# Patient Record
Sex: MALE | Race: WHITE | HISPANIC OR LATINO | Employment: FULL TIME | URBAN - METROPOLITAN AREA
[De-identification: names, ages, dates, MRNs, and addresses within clinical notes are randomized per-mention and may not be internally consistent; named-entity substitution may affect disease eponyms.]

---

## 2018-05-29 ENCOUNTER — SLEEP CENTER VISIT (OUTPATIENT)
Dept: SLEEP MEDICINE | Facility: MEDICAL CENTER | Age: 33
End: 2018-05-29
Payer: COMMERCIAL

## 2018-05-29 VITALS
HEART RATE: 77 BPM | SYSTOLIC BLOOD PRESSURE: 112 MMHG | BODY MASS INDEX: 29.96 KG/M2 | RESPIRATION RATE: 16 BRPM | HEIGHT: 71 IN | OXYGEN SATURATION: 96 % | WEIGHT: 214 LBS | DIASTOLIC BLOOD PRESSURE: 78 MMHG

## 2018-05-29 DIAGNOSIS — E78.5 DYSLIPIDEMIA: ICD-10-CM

## 2018-05-29 DIAGNOSIS — Z78.9 NON-SMOKER: ICD-10-CM

## 2018-05-29 DIAGNOSIS — G47.33 OSA (OBSTRUCTIVE SLEEP APNEA): ICD-10-CM

## 2018-05-29 PROCEDURE — 99204 OFFICE O/P NEW MOD 45 MIN: CPT | Performed by: INTERNAL MEDICINE

## 2018-05-29 RX ORDER — ATORVASTATIN CALCIUM 10 MG/1
1 TABLET, FILM COATED ORAL
COMMUNITY
Start: 2018-02-14 | End: 2018-08-28

## 2018-05-29 RX ORDER — ALPRAZOLAM 1 MG/1
1 TABLET ORAL
COMMUNITY
Start: 2018-04-23

## 2018-05-29 NOTE — PROGRESS NOTES
CC: Establish care for sleep apnea    HPI:    Marin Cao is a 33y/o male kindly referred by Kolton Zhang M.D. to establish care for sleep apnea diagnosed at [facility].    The patient's sleep study was performed in December 2016 and showed an apnea hypopnea index of 47.8 with a monae saturation of 77%.  The patient was noted to have significant snoring and a moderate degree of periodic limb movements with out arousals.     He was not able to use the machine regularly until just recently. Still feeling a bit tired but has only used his unit for the last month.    The patient's 30 day compliance shows usage for 28 out of 30 days for an average 7 hours 3 minutes.  He has residual apnea hypopnea index of 1.2.  His average time in large leak today is 2 minutes and 2 seconds.  He is on auto titrating CPAP 10-13 cm H2O.    He is not having any undue problems with mask fit, mask leak, airway dryness, or pressure tolerance.    He continues to achieve significant benefit from the use of his cpap.    There are no active problems to display for this patient.      Past Medical History:   Diagnosis Date   • Hyperlipidemia    • Sleep apnea         History reviewed. No pertinent surgical history.    Family History   Problem Relation Age of Onset   • No Known Problems Mother    • No Known Problems Father    • Sleep Apnea Neg Hx        Social History     Social History   • Marital status: Single     Spouse name: N/A   • Number of children: N/A   • Years of education: N/A     Occupational History   • Not on file.     Social History Main Topics   • Smoking status: Never Smoker   • Smokeless tobacco: Never Used   • Alcohol use 1.2 oz/week     2 Cans of beer per week   • Drug use: No   • Sexual activity: Not on file     Other Topics Concern   • Not on file     Social History Narrative   • No narrative on file       Current Outpatient Prescriptions   Medication Sig Dispense Refill   • ALPRAZolam  "(XANAX) 1 MG Tab Take 1 mg by mouth.     • atorvastatin (LIPITOR) 10 MG Tab Take 1 tablet by mouth.       No current facility-administered medications for this visit.     \"CURRENT RX\"    ALLERGIES: Penicillins    ROS  Constitutional: Denies fever, chills, sweats,  weight loss, fatigue.  Eyes: Denies vision loss, pain, drainage, double vision, glasses.  Ears/Nose/Mouth/Throat: Denies earache, difficulty hearing, rhinitis/nasal congestion, injury, recurrent sore throat, persistent hoarseness, decayed teeth/toothaches, ringing or buzzing in the ears.  Cardiovascular: Denies chest pain, tightness, palpitations, swelling in legs/feet, fainting, difficulty breathing when lying down but gets better when sitting up.   Respiratory: Denies shortness of breath, cough, sputum, wheezing, painful breathing, coughing up blood.   Sleep: per HPI  Gastrointestinal: Denies  difficulty swallowing, nausea, abdominal pain, diarrhea, constipation, heartburn.  Genitourinary: Denies  blood in urine, discharge, frequent urination.   Musculoskeletal: Denies painful joints, sore muscles, back pain.   Integumentary: Denies rashes, lumps, color changes.   Neurological: Denies frequent headaches,weakness, dizziness.    PHYSICAL EXAM    /78   Pulse 77   Resp 16   Ht 1.803 m (5' 11\")   Wt 97.1 kg (214 lb)   SpO2 96%   BMI 29.85 kg/m²   Appearance: Well-nourished, well-developed, no acute distress  Eyes:  PERRLA, EOMI  ENMT: without lesions, deformities;hearing grossly intact; tongue normal, posterior pharynx without erythema or exudate; Mallampati classification:   Neck: Supple, trachea midline, no masses  Respiratory effort:  No intercostal retractions or use of accessory muscles  Lung auscultation:  No wheezes rhonchi rubs or rales  Cardiac: No murmurs, rubs, or gallops; regular rhythm, normal rate; no edema  Abdomen:  No tenderness, no organomegaly  Musculoskeletal:  Grossly normal; gait and station normal; digits and nails " "normal  Skin:  No rashes, petechiae, cyanosis  Neurologic: without focal signs; oriented to person, time, place, and purpose; judgement intact  Psychiatric:  No depression, anxiety, agitation      PROBLEMS:    1. AGUS (obstructive sleep apnea)    - DME MASK AND SUPPLIES    2. Non-smoker      3. Dyslipidemia      4.  PLMS (not RLS)        PLAN:   Has been advised to continue the current auto titrating CPAP 10-13 cm H2O., clean equipment frequently, and get new mask and supplies as allowed by insurance and DME. Advised about potential problems including nasal obstruction/stuffiness, mask leak or discomfort , frequent awakenings, chill or dryness of the upper airway, noise, inconvenience, and lack of benefit. Recommend an earlier appointment, if significant treatment barriers develop.    He is still a bit sleepy but has been on Rx with cpap for only about a month. If EDS persists in 3 months at the next OV, consider MSLT protocol or empiric Nuvigil.    Sleep hygiene or the basic rules for good night sleep were discussed with the patient. The patient was advised to sleep only as much as as needed to feel rested and then to get out of bed, to keep a regular sleep schedule, to try to avoid forcing sleep, to exercise regularly for at least 20 minutes preferably 4-5 hours prior to bedtime, to avoid caffeinated beverages after lunch, to avoid alcohol near bedtime and avoid \"nightcaps\", to avoid smoking especially in the evening, to avoid going to bed hungry, to keep the bedroom cool and dark, to avoid prolonged use of light emitting screens before bedtime, and to deal with worries before bedtime.      Return in about 3 months (around 8/29/2018).                      "

## 2018-08-29 ENCOUNTER — SLEEP CENTER VISIT (OUTPATIENT)
Dept: SLEEP MEDICINE | Facility: MEDICAL CENTER | Age: 33
End: 2018-08-29
Payer: COMMERCIAL

## 2018-08-29 VITALS
DIASTOLIC BLOOD PRESSURE: 64 MMHG | WEIGHT: 215 LBS | SYSTOLIC BLOOD PRESSURE: 114 MMHG | BODY MASS INDEX: 30.1 KG/M2 | HEIGHT: 71 IN | OXYGEN SATURATION: 95 % | RESPIRATION RATE: 16 BRPM | HEART RATE: 79 BPM

## 2018-08-29 DIAGNOSIS — G47.10 HYPERSOMNOLENCE: ICD-10-CM

## 2018-08-29 DIAGNOSIS — G47.61 PLMD (PERIODIC LIMB MOVEMENT DISORDER): ICD-10-CM

## 2018-08-29 DIAGNOSIS — G47.33 OSA (OBSTRUCTIVE SLEEP APNEA): ICD-10-CM

## 2018-08-29 PROCEDURE — 99213 OFFICE O/P EST LOW 20 MIN: CPT | Performed by: INTERNAL MEDICINE

## 2018-08-29 NOTE — PROGRESS NOTES
Chief Complaint   Patient presents with   • Follow-Up     AGUS       HPI: This patient is a 32 y.o. Male who returns for obstructive sleep apnea.  Polysomnography in December 2016 showed severe AGUS with AHI 47.8 events per hour with desaturations to 77%.  He also had periodic limb movements noted.  He has been using AutoPap: 10-13 cm of water with compliance card showing 97% CPAP usage for 6.5 hours nightly.  His average pressures are 10 cm of water and AHI is normal at 0.7.  Despite CPAP compliance, he is awakening feeling tired and describes significant daytime hypersomnolence.  His wife notes periodic snoring despite CPAP.  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  He denies falling asleep at work.  He drinks 3 cups of coffee daily, denies tobacco, alcohol or recreational drug use.  His BMI is stable at 29.      Past Medical History:   Diagnosis Date   • Hyperlipidemia    • Sleep apnea        Social History     Social History   • Marital status: Single     Spouse name: N/A   • Number of children: N/A   • Years of education: N/A     Occupational History   • Not on file.     Social History Main Topics   • Smoking status: Never Smoker   • Smokeless tobacco: Never Used   • Alcohol use 1.2 oz/week     2 Cans of beer per week   • Drug use: No   • Sexual activity: Not on file     Other Topics Concern   • Not on file     Social History Narrative   • No narrative on file       Family History   Problem Relation Age of Onset   • No Known Problems Mother    • No Known Problems Father    • Sleep Apnea Neg Hx        Current Outpatient Prescriptions on File Prior to Visit   Medication Sig Dispense Refill   • ALPRAZolam (XANAX) 1 MG Tab Take 1 mg by mouth.       No current facility-administered medications on file prior to visit.        Allergies: Penicillins    ROS:   Constitutional: Denies fevers, chills, night sweats, fatigue or weight loss  Eyes: Denies vision loss, pain, drainage, double vision  Ears, Nose,  "Throat: Denies earache, difficulty hearing, tinnitus, nasal congestion, hoarseness  Cardiovascular: Denies chest pain, tightness, palpitations, orthopnea or edema  Respiratory: Denies shortness of breath, cough, wheezing, hemoptysis  Sleep: + daytime sleepiness, snoring, denies apneas, insomnia, morning headaches  GI: Denies heartburn, dysphagia, nausea, abdominal pain, diarrhea or constipation  : Denies frequent urination, hematuria, discharge or painful urination  Musculoskeletal: Denies back pain, painful joints, sore muscles  Neurological: Denies weakness or headaches,+RLS  Skin: No rashes    Blood pressure 114/64, pulse 79, resp. rate 16, height 1.803 m (5' 11\"), weight 97.5 kg (215 lb), SpO2 95 %.    Physical Exam:  Appearance: Well-nourished, well-developed, in no acute distress  HEENT: Normocephalic, atraumatic, white sclera, PERRLA, oropharynx clear  Neck: No adenopathy or masses  Respiratory: no intercostal retractions or accessory muscle use  Lungs auscultation: Clear to auscultation bilaterally  Cardiovascular: Regular rate rhythm. No murmurs, rubs or gallops.  No LE edema  Abdomen: soft, nondistended  Gait: Normal  Digits: No clubbing, cyanosis  Motor: No focal deficits  Orientation: Oriented to time, person and place    Diagnosis:  1. AGUS (obstructive sleep apnea)  POLYSOMNOGRAPHY TITRATION    MULTIPLE SLEEP LATENCY TEST   2. PLMD (periodic limb movement disorder)     3. Hypersomnolence     4. BMI 29.0-29.9,adult         Plan:  The patient shows excellent compliance on AutoPap therapy with normal AHI, however persistent excessive daytime hypersomnolence.  This can be secondary to residual apnea/hypoxia, periodic limb movement disorder, or idiopathic hypersomnolence.  Recommend a CPAP titration polysomnogram in the sleep laboratory to confirm adequate treatment of AGUS, and exclude secondary sleep disorders such as periodic limb movement disorder.  If his sleep study is unremarkable, then he will " proceed with a multiple sleep latency test (MSLT) to evaluate for hypersomnolence.  Return for after sleep study.

## 2018-09-25 ENCOUNTER — APPOINTMENT (OUTPATIENT)
Dept: SLEEP MEDICINE | Facility: MEDICAL CENTER | Age: 33
End: 2018-09-25
Attending: INTERNAL MEDICINE
Payer: COMMERCIAL

## 2018-09-26 ENCOUNTER — APPOINTMENT (OUTPATIENT)
Dept: SLEEP MEDICINE | Facility: MEDICAL CENTER | Age: 33
End: 2018-09-26
Attending: INTERNAL MEDICINE
Payer: COMMERCIAL

## 2018-10-23 ENCOUNTER — SLEEP STUDY (OUTPATIENT)
Dept: SLEEP MEDICINE | Facility: MEDICAL CENTER | Age: 33
End: 2018-10-23
Payer: COMMERCIAL

## 2018-10-23 DIAGNOSIS — G47.33 OSA (OBSTRUCTIVE SLEEP APNEA): ICD-10-CM

## 2018-10-23 PROCEDURE — 95811 POLYSOM 6/>YRS CPAP 4/> PARM: CPT | Performed by: FAMILY MEDICINE

## 2018-10-24 ENCOUNTER — SLEEP STUDY (OUTPATIENT)
Dept: SLEEP MEDICINE | Facility: MEDICAL CENTER | Age: 33
End: 2018-10-24
Attending: INTERNAL MEDICINE
Payer: COMMERCIAL

## 2018-10-24 DIAGNOSIS — G47.33 OSA (OBSTRUCTIVE SLEEP APNEA): ICD-10-CM

## 2018-10-24 PROCEDURE — 95805 MULTIPLE SLEEP LATENCY TEST: CPT | Performed by: FAMILY MEDICINE

## 2018-10-29 NOTE — PROCEDURES
Clinical Comments:  The patient underwent a comprehensive polysomnogram using the standard montage for measurement of parameters of sleep, respiratory events, movement abnormalities, heart rate and rhythm. A microphone was used to monitor snoring.        INTERPRETATION:  The total recording time was 506.0 minutes with a sleep period of 420.0 minutes and the total sleep time was 454.0 minutes with a sleep efficiency of 89.7%.  The sleep latency was 20.0 minutes, and REM latency was 88.0 minutes.  The patient experienced 82 arousals in total, for an arousal index of 10.8     RESPIRATORY: The patient had 1 apneas in total.  Of these, 1 were obstructive apneas, and 0 were central apneas.  This resulted in an apnea index (AI) of 0.1.  The patient had 11 hypopneas, for a hypopnea index of 1.5.  The overall AHI was 1.6, while the AHI during Stage R sleep was 3.9.  AHI while supine was 5.5.     OXIMETRY: Oxygen saturation monitoring showed a mean SpO2 of 94.3%, with a minimum oxygen saturation of 88.0%.  Oxygen saturations were less than or = 89% for 5.5 minutes of sleep time.     CARDIAC: The highest heart rate during the recording was 100.0 beats per minute.  The average heart rate during sleep was 100.0 bpm.     LIMB MOVEMENTS: There were a total of 50 PLMs during sleep, of which 0 were PLMs arousals.  This resulted in a PLMS index of 6.6.    Technical summary: The patient underwent a CPAP titration prior the MSLT on 10/24/18.  This was a 16 channel montage study to include a 6 channel EEG, a 2 channel EOG, and chin EMG, left and right leg EMG, a snore channel, and a CFLOW pressure transducer.   Respiratory effort was assessed with the use of a thoracic and abdominal monitor and overnight oximetry was obtained. Audio and video recordings were reviewed. This was a fully attended study and sleep stage scoring was performed. The test was technically adequate.    General sleep summary:  During the overnight study, the sleep  latency was 44 min which is prolonged. The REM latency was 88 min which is normal. The total sleep time was 454 min and sleep efficiency was 89 % which is normal.  Sleep stage proportions showed no N3 sleep other wise normal sleep architecture.  In regards to sleep quality there was a mild degree of sleep fragmentation as shown by the arousal index of 10 an hour. The arousals were due to spontaneous arousal.    CPAP Titration:  The PAP titration was initiated with home CPAP pressure at 10 cm of water . CPAP of 11 cm was tried during the study. However she well on her current pressure of CPAP 10 cm. At this pressure pressure the patient was observed in the non supine REM sleep stage. The apnea hypopnea index improved to 1.9 per hour and O2 monae 88%. The average O2 stauration was 93%. She spent 0.9 % of sleep time below 89% O2 saturation. Snoring was resolved. There were no significant periodic limb movements.  The patient demonstrated sinus tachycardia and an average heart rate of 91 beats per minute.  There was no ventricular ectopy or tachyarrhythmias. The patient utilized medium F20 mask with heated humidification. The CPAP was well-tolerated and there were minimal air leaks. No supplemental oxygen was required.    Impression:  1.  AGUS  2.  Successful titration   3.  Normal REM latency and sleep efficiency       Recommendations:  Continue CPAP at 10 cm. This was followed by MSLT. Please see MSLT report for the details. In some cases alternative treatment options may prove effective in resolving sleep apnea and these options include upper airway surgery, the use of a dental orthotic or weight loss and positional therapy. Clinical correlation is required. In general patients with sleep apnea are advised to avoid alcohol and sedatives and to not operate a motor vehicle while drowsy and are at a greater risk for cardiovascular disease.

## 2018-12-27 ENCOUNTER — SLEEP CENTER VISIT (OUTPATIENT)
Dept: SLEEP MEDICINE | Facility: MEDICAL CENTER | Age: 33
End: 2018-12-27
Payer: COMMERCIAL

## 2018-12-27 VITALS
HEIGHT: 71 IN | BODY MASS INDEX: 30.52 KG/M2 | DIASTOLIC BLOOD PRESSURE: 82 MMHG | RESPIRATION RATE: 15 BRPM | OXYGEN SATURATION: 96 % | HEART RATE: 70 BPM | SYSTOLIC BLOOD PRESSURE: 120 MMHG | WEIGHT: 218 LBS

## 2018-12-27 DIAGNOSIS — G47.01 INSOMNIA DUE TO MEDICAL CONDITION: ICD-10-CM

## 2018-12-27 DIAGNOSIS — G47.10 HYPERSOMNIA: ICD-10-CM

## 2018-12-27 DIAGNOSIS — G47.33 OSA (OBSTRUCTIVE SLEEP APNEA): ICD-10-CM

## 2018-12-27 PROCEDURE — 99214 OFFICE O/P EST MOD 30 MIN: CPT | Performed by: FAMILY MEDICINE

## 2018-12-27 NOTE — PATIENT INSTRUCTIONS
Stimulus control (Ref: UpToDate)    Patients with insomnia may associate their bed and bedroom with the fear of not sleeping or other arousing events, rather than the more pleasurable anticipation of sleep. The longer one stays in bed trying to sleep, the stronger the association becomes. This perpetuates the difficulty falling asleep.Stimulus control therapy is a strategy whose purpose is to disrupt this association by enhancing the likelihood of sleep . Patients should not go to bed until they are sleepy and should use the bed primarily for sleep (and not for reading, watching television, eating, or worrying). They should not spend more than 20 minutes in bed awake. If they are awake after 20 minutes, they should leave the bedroom and engage in a relaxing activity, such as reading or listening to soothing music. Patients should not engage in activities that stimulate them or reward them for being awake in the middle of the night, such as eating or watching television. In addition, they should not return to bed until they are tired and feel ready to sleep. If they return to bed and still cannot sleep within 20 minutes, the process should be repeated. An alarm should be set to wake the patient at the same time every morning, including weekends. Daytime naps are not allowed.    Sleep hygiene (ref: UpToDate)  ?Sleep as long as necessary to feel rested (usually seven to eight hours for adults) and then get out of bed  ?Maintain a regular sleep schedule, particularly a regular wake-up time in the morning  ?Try not to force sleep  ?Avoid caffeinated beverages after lunch  ?Avoid alcohol near bedtime (eg, late afternoon and evening)  ?Avoid smoking or other nicotine intake, particularly during the evening  ?Adjust the bedroom environment as needed to decrease stimuli (eg, reduce ambient light, turn off the television or radio)  ?Avoid use of light-emitting screens  (laptops, tablets, smartphones, [employer] Navigation[employer]s) 2 hours before  "bedtime   ?Resolve concerns or worries before bedtime  ?Exercise regularly for at least 20 minutes, preferably more than four to five hours prior to bedtime.  ?Avoid daytime naps, especially if they are longer than 20 to 30 minutes or occur late in the day    BOOKS:  \"Say Maxim to Insomnia\" by Roland Navarro OR \"No More Sleepless Nights\" by Sriram Mike    "

## 2018-12-27 NOTE — PROGRESS NOTES
[facility] Follow Up Note     Date: 12/28/2018 / Time: 10:51 AM    Patient ID:   Name:             Marin Cao     YOB: 1985  Age:                 33 y.o.  male   MRN:               [id number]      Thank you for requesting a sleep medicine consultation on Marin Cao at the sleep center. He presents today with the chief complaints of AGUS and SS follow up.     HISTORY OF PRESENT ILLNESS:       Pt is currently on CPAP 10 cm . He goes to sleep around 11 pm and wakes up around 3;45 am on week days and 6-7 am on weekends. He is getting about 6 hrs of sleep on a good night and about 4 hr of sleep on a bad night. He has tried different sleep aides and currently on xanax 1 mg  The bad nights are most per week. He is using CPAP most days of the week. Pt reports 6 hrs of average nightly use of CPAP. Pt denies snoring, gasping,choking.Pt also denies significant mask leak that is interfering with sleep.      The 30 day compliance was downloaded which shows adequate compliance with more that 4 hr usage about 96%. The AHI is has improved to 0.7/hr. The mask leak is normaln. The symptoms of snoring and gasping has improved.     However he continues to have residual EDS. Maybell today was 10. Other than EDS he denies sleep attack, hypnagogic hallucination and cataplexy.     Since his last visit he had CPAP titration followed with MSLT due to residual EDS. It initiated with home CPAP pressure at 10 cm of water . CPAP of 11 cm was tried during the study. However she well on her current pressure of CPAP 10 cm. At this pressure pressure the patient was observed in the non supine REM sleep stage. The apnea hypopnea index improved to 1.9 per hour and O2 monae 88%. The average O2 stauration was 93%. She spent 0.9 % of sleep time below 89% O2 saturation.     The Multiple Sleep Latency Test (MSLT) was done on the next day with 4 naps. There was no evidence of hypersomnolence with a short, 4-nap mean  "sleep latency of 15:49 min. NO sleep onset REM periods (SOREMPs) were seen to suggest narcolepsy.         SLEEP HISTORY   Polysomnography in December 2016 showed severe AGUS with AHI 47.8 events per hour with desaturations to 77%.  He also had periodic limb movements noted.        REVIEW OF SYSTEMS:       Constitutional: Denies fevers, Denies weight changes  Eyes: Denies changes in vision, no eye pain  Ears/Nose/Throat/Mouth: Denies nasal congestion or sore throat   Cardiovascular: Denies chest pain or palpitations   Respiratory: Denies shortness of breath , Denies cough  Gastrointestinal/Hepatic: Denies abdominal pain, nausea, vomiting, diarrhea, constipation or GI bleeding   Genitourinary: Denies bladder dysfunction, dysuria or frequency  Musculoskeletal/Rheum: Denies  joint pain and swelling   Skin/Breast: Denies rash,   Neurological: Denies headache, confusion, memory loss or focal weakness/parasthesias  Psychiatric: denies mood disorder     Comprehensive review of systems form is reviewed with the patient and is attached in the EMR.     PMH:  has a past medical history of Hyperlipidemia and Sleep apnea.  MEDS:   Current Outpatient Prescriptions:   •  ALPRAZolam (XANAX) 1 MG Tab, Take 1 mg by mouth., Disp: , Rfl:   ALLERGIES:   Allergies   Allergen Reactions   • Penicillins      SURGHX: No past surgical history on file.  SOCHX:  reports that he has never smoked. He has never used smokeless tobacco. He reports that he drinks about 1.2 oz of alcohol per week . He reports that he does not use drugs..  FH:   Family History   Problem Relation Age of Onset   • No Known Problems Mother    • No Known Problems Father    • Sleep Apnea Neg Hx          Physical Exam:  Vitals/ General Appearance:   Weight/BMI: Body mass index is 30.4 kg/m².  Blood pressure 120/82, pulse 70, resp. rate 15, height 1.803 m (5' 11\"), weight 98.9 kg (218 lb), SpO2 96 %.  Vitals:    12/27/18 1119   BP: 120/82   BP Location: Right arm   Patient " "Position: Sitting   BP Cuff Size: Adult   Pulse: 70   Resp: 15   SpO2: 96%   Weight: 98.9 kg (218 lb)   Height: 1.803 m (5' 11\")       Pt. is alert and oriented to time, place and person. Cooperative and in no apparent distress.       1. Head: Atraumatic, normocephalic.   2. Ears: Normal tympanic membrane and no discharge  3. Nose: No inferior turbinate hypertophy  4. Throat: Oropharynx appears crowded in that the palate is not overhanging   5. Neck: Supple. No thyromegaly  6. Chest: Trachea central, no spine deformity   7. Lungs auscultation: B/L good air entry, vesicular breath sounds, no adventitious sounds  8. Heart auscultation: 1st and 2nd heart sounds normal, regular rhythm. No appreciable murmur.  9.Extremities: no clubbing, no pedal edema.  10. Skin: No rash      INVESTIGATIONS:           ASSESSMENT AND PLAN     1. Sleep Apnea (AGUS)      The pathophysiology of sleep anea and the increased risk of cardiovascular morbidity from untreated sleep apnea is discussed in detail with the patient.   He is urged to avoid supine sleep, weight gain and alcoholic beverages since all of these can worsen sleep apnea. He is cautioned against drowsy driving. If He feels sleepy while driving, He must pull over for a break/nap, rather than persist on the road, in the interest of He own safety and that of others on the road.   Plan   - Continue CPAp at 10 cm    - compliance download was reviewed and discussed with the pt   - compliance was reinforced     2.Insomnia:  The importance of cognitive restructuring and behavior modification therapy is emphasized for long-term improvement rather than the use of hypnotic agents, which may offer short term relief but may lead to the development of tolerance and side effects with prolonged use. Evening exercise, wind-down before bedtime, and stimulus control (leaving the bed when unable to fall back asleep at night) are explained.   Plan   - Handouts on stimulus control and sleep hygiene " are given and a couple of titles of books on insomnia are recommended, written by behavioral psychologists.    - CBT-I with Dr. Blum     3.Hypersomnolence:   He is is cautioned against drowsy driving and operating heavy machinery. If she feels sleepy while driving, she must pull over for a break/nap, rather than persist on the road, in the interest of her own safety and that of others on the road        Plan   - Normal sleep latency on MSLT w/o SOREMPs. He doesn't meet the criteria for idiopathic  Hypersomnolence.   - recommended to improve sleep time, therefore referred for CBT-I. He is currently using xanax as sleep  aide, recommended ONLY PRN use   - stimulants including provigil and nuvigil was dicussed however deferred at this point       4. Regarding treatment of other past medical problems and general health maintenance,  He is urged to follow up with PCP.

## 2021-09-14 ENCOUNTER — TELEPHONE (OUTPATIENT)
Dept: SLEEP MEDICINE | Facility: MEDICAL CENTER | Age: 36
End: 2021-09-14

## 2021-09-14 NOTE — TELEPHONE ENCOUNTER
Caller: Marin    Phone Number: [phone] (home)     Message: Pt called and lm.  Requesting a slip be sent over for his supplies.      09/14/21:  Called pt and lm. Notifying pt we would not be able to supply that.  He would have to be re-referred to us and us to see him as a NP since we have not seen him over 3 yrs